# Patient Record
Sex: MALE | Race: WHITE | Employment: UNEMPLOYED | URBAN - NONMETROPOLITAN AREA
[De-identification: names, ages, dates, MRNs, and addresses within clinical notes are randomized per-mention and may not be internally consistent; named-entity substitution may affect disease eponyms.]

---

## 2017-01-03 ENCOUNTER — ANTI-COAG VISIT (OUTPATIENT)
Dept: OTHER | Age: 62
End: 2017-01-03

## 2017-01-03 VITALS
WEIGHT: 219.2 LBS | BODY MASS INDEX: 31.01 KG/M2 | DIASTOLIC BLOOD PRESSURE: 66 MMHG | HEART RATE: 82 BPM | SYSTOLIC BLOOD PRESSURE: 131 MMHG

## 2017-01-03 DIAGNOSIS — R79.1 ELEVATED INR: ICD-10-CM

## 2017-01-03 DIAGNOSIS — I82.401 DEEP VEIN THROMBOSIS (DVT) OF RIGHT LOWER EXTREMITY, UNSPECIFIED CHRONICITY, UNSPECIFIED VEIN (HCC): Primary | ICD-10-CM

## 2017-01-03 LAB — POC INR: 3.3 (ref 0.8–1.2)

## 2017-01-03 PROCEDURE — 99212 OFFICE O/P EST SF 10 MIN: CPT | Performed by: NURSE PRACTITIONER

## 2017-01-03 PROCEDURE — 85610 PROTHROMBIN TIME: CPT | Performed by: NURSE PRACTITIONER

## 2017-01-03 ASSESSMENT — ENCOUNTER SYMPTOMS
ANAL BLEEDING: 0
CONSTIPATION: 0
BLOOD IN STOOL: 0
SHORTNESS OF BREATH: 0
DIARRHEA: 0

## 2017-01-17 ENCOUNTER — ANTI-COAG VISIT (OUTPATIENT)
Dept: OTHER | Age: 62
End: 2017-01-17

## 2017-01-17 VITALS
WEIGHT: 214 LBS | HEART RATE: 80 BPM | SYSTOLIC BLOOD PRESSURE: 120 MMHG | BODY MASS INDEX: 30.27 KG/M2 | DIASTOLIC BLOOD PRESSURE: 94 MMHG

## 2017-01-17 DIAGNOSIS — I82.4Z3 DEEP VEIN THROMBOSIS (DVT) OF DISTAL VEIN OF BOTH LOWER EXTREMITIES, UNSPECIFIED CHRONICITY (HCC): ICD-10-CM

## 2017-01-17 DIAGNOSIS — I82.401 DEEP VEIN THROMBOSIS (DVT) OF RIGHT LOWER EXTREMITY, UNSPECIFIED CHRONICITY, UNSPECIFIED VEIN (HCC): ICD-10-CM

## 2017-01-17 LAB — POC INR: 3.5 (ref 0.8–1.2)

## 2017-01-17 PROCEDURE — 3017F COLORECTAL CA SCREEN DOC REV: CPT | Performed by: NURSE PRACTITIONER

## 2017-01-17 PROCEDURE — G8419 CALC BMI OUT NRM PARAM NOF/U: HCPCS | Performed by: NURSE PRACTITIONER

## 2017-01-17 PROCEDURE — G8427 DOCREV CUR MEDS BY ELIG CLIN: HCPCS | Performed by: NURSE PRACTITIONER

## 2017-01-17 PROCEDURE — 99999 PR OFFICE/OUTPT VISIT,PROCEDURE ONLY: CPT | Performed by: NURSE PRACTITIONER

## 2017-01-17 PROCEDURE — 85610 PROTHROMBIN TIME: CPT | Performed by: NURSE PRACTITIONER

## 2017-01-17 ASSESSMENT — ENCOUNTER SYMPTOMS
SHORTNESS OF BREATH: 0
CONSTIPATION: 0
DIARRHEA: 0
BLOOD IN STOOL: 0

## 2017-01-31 ENCOUNTER — ANTI-COAG VISIT (OUTPATIENT)
Dept: OTHER | Age: 62
End: 2017-01-31

## 2017-01-31 VITALS
DIASTOLIC BLOOD PRESSURE: 77 MMHG | HEART RATE: 68 BPM | WEIGHT: 212 LBS | BODY MASS INDEX: 29.99 KG/M2 | SYSTOLIC BLOOD PRESSURE: 113 MMHG

## 2017-01-31 DIAGNOSIS — I82.401 DEEP VEIN THROMBOSIS (DVT) OF RIGHT LOWER EXTREMITY, UNSPECIFIED CHRONICITY, UNSPECIFIED VEIN (HCC): ICD-10-CM

## 2017-01-31 DIAGNOSIS — I82.4Z3 DEEP VEIN THROMBOSIS (DVT) OF DISTAL VEIN OF BOTH LOWER EXTREMITIES, UNSPECIFIED CHRONICITY (HCC): ICD-10-CM

## 2017-01-31 LAB
HCT VFR BLD CALC: 43.3 % (ref 42–52)
HEMOGLOBIN: 14.6 GM/DL (ref 14–18)
POC INR: 1.6 (ref 0.8–1.2)

## 2017-01-31 PROCEDURE — G8419 CALC BMI OUT NRM PARAM NOF/U: HCPCS | Performed by: PHARMACIST

## 2017-01-31 PROCEDURE — 3017F COLORECTAL CA SCREEN DOC REV: CPT | Performed by: PHARMACIST

## 2017-01-31 PROCEDURE — 99999 PR OFFICE/OUTPT VISIT,PROCEDURE ONLY: CPT | Performed by: PHARMACIST

## 2017-01-31 PROCEDURE — G8427 DOCREV CUR MEDS BY ELIG CLIN: HCPCS | Performed by: PHARMACIST

## 2017-01-31 PROCEDURE — 85610 PROTHROMBIN TIME: CPT | Performed by: PHARMACIST

## 2017-01-31 ASSESSMENT — ENCOUNTER SYMPTOMS
SHORTNESS OF BREATH: 0
DIARRHEA: 0
CONSTIPATION: 0
BLOOD IN STOOL: 0

## 2017-02-14 ENCOUNTER — ANTI-COAG VISIT (OUTPATIENT)
Dept: OTHER | Age: 62
End: 2017-02-14

## 2017-02-14 VITALS
DIASTOLIC BLOOD PRESSURE: 75 MMHG | HEART RATE: 82 BPM | WEIGHT: 211.4 LBS | SYSTOLIC BLOOD PRESSURE: 120 MMHG | BODY MASS INDEX: 29.9 KG/M2

## 2017-02-14 DIAGNOSIS — I82.401 DEEP VEIN THROMBOSIS (DVT) OF RIGHT LOWER EXTREMITY, UNSPECIFIED CHRONICITY, UNSPECIFIED VEIN (HCC): ICD-10-CM

## 2017-02-14 DIAGNOSIS — I82.4Z3 DEEP VEIN THROMBOSIS (DVT) OF DISTAL VEIN OF BOTH LOWER EXTREMITIES, UNSPECIFIED CHRONICITY (HCC): ICD-10-CM

## 2017-02-14 LAB — POC INR: 1.3 (ref 0.8–1.2)

## 2017-02-14 PROCEDURE — 85610 PROTHROMBIN TIME: CPT | Performed by: NURSE PRACTITIONER

## 2017-02-14 PROCEDURE — G8419 CALC BMI OUT NRM PARAM NOF/U: HCPCS | Performed by: NURSE PRACTITIONER

## 2017-02-14 PROCEDURE — G8427 DOCREV CUR MEDS BY ELIG CLIN: HCPCS | Performed by: NURSE PRACTITIONER

## 2017-02-14 PROCEDURE — 99999 PR OFFICE/OUTPT VISIT,PROCEDURE ONLY: CPT | Performed by: NURSE PRACTITIONER

## 2017-02-14 PROCEDURE — 3017F COLORECTAL CA SCREEN DOC REV: CPT | Performed by: NURSE PRACTITIONER

## 2017-02-14 ASSESSMENT — ENCOUNTER SYMPTOMS
SHORTNESS OF BREATH: 0
NAUSEA: 1
BLOOD IN STOOL: 0
DIARRHEA: 0
CONSTIPATION: 0

## 2017-02-28 ENCOUNTER — ANTI-COAG VISIT (OUTPATIENT)
Dept: OTHER | Age: 62
End: 2017-02-28

## 2017-02-28 VITALS
SYSTOLIC BLOOD PRESSURE: 125 MMHG | DIASTOLIC BLOOD PRESSURE: 78 MMHG | HEART RATE: 91 BPM | WEIGHT: 207 LBS | BODY MASS INDEX: 29.28 KG/M2

## 2017-02-28 DIAGNOSIS — I82.401 DEEP VEIN THROMBOSIS (DVT) OF RIGHT LOWER EXTREMITY, UNSPECIFIED CHRONICITY, UNSPECIFIED VEIN (HCC): ICD-10-CM

## 2017-02-28 DIAGNOSIS — I82.4Z3 DEEP VEIN THROMBOSIS (DVT) OF DISTAL VEIN OF BOTH LOWER EXTREMITIES, UNSPECIFIED CHRONICITY (HCC): ICD-10-CM

## 2017-02-28 LAB — POC INR: 3.4 (ref 0.8–1.2)

## 2017-02-28 PROCEDURE — G8427 DOCREV CUR MEDS BY ELIG CLIN: HCPCS | Performed by: NURSE PRACTITIONER

## 2017-02-28 PROCEDURE — G8419 CALC BMI OUT NRM PARAM NOF/U: HCPCS | Performed by: NURSE PRACTITIONER

## 2017-02-28 PROCEDURE — 99999 PR OFFICE/OUTPT VISIT,PROCEDURE ONLY: CPT | Performed by: NURSE PRACTITIONER

## 2017-02-28 PROCEDURE — 3017F COLORECTAL CA SCREEN DOC REV: CPT | Performed by: NURSE PRACTITIONER

## 2017-02-28 PROCEDURE — 85610 PROTHROMBIN TIME: CPT | Performed by: NURSE PRACTITIONER

## 2017-02-28 ASSESSMENT — ENCOUNTER SYMPTOMS
DIARRHEA: 0
CONSTIPATION: 0
BLOOD IN STOOL: 0
SHORTNESS OF BREATH: 0

## 2017-03-13 DIAGNOSIS — I82.4Z3 DEEP VEIN THROMBOSIS (DVT) OF DISTAL VEIN OF BOTH LOWER EXTREMITIES, UNSPECIFIED CHRONICITY (HCC): Primary | ICD-10-CM

## 2017-03-13 DIAGNOSIS — Z79.01 ANTICOAGULATED ON COUMADIN: ICD-10-CM

## 2017-03-21 ENCOUNTER — ANTI-COAG VISIT (OUTPATIENT)
Dept: OTHER | Age: 62
End: 2017-03-21

## 2017-03-21 VITALS
WEIGHT: 212 LBS | DIASTOLIC BLOOD PRESSURE: 74 MMHG | HEART RATE: 68 BPM | BODY MASS INDEX: 29.99 KG/M2 | SYSTOLIC BLOOD PRESSURE: 124 MMHG

## 2017-03-21 DIAGNOSIS — Z79.01 ANTICOAGULATED ON COUMADIN: ICD-10-CM

## 2017-03-21 DIAGNOSIS — I82.4Z3 DEEP VEIN THROMBOSIS (DVT) OF DISTAL VEIN OF BOTH LOWER EXTREMITIES, UNSPECIFIED CHRONICITY (HCC): ICD-10-CM

## 2017-03-21 LAB — POC INR: 2.1 (ref 0.8–1.2)

## 2017-03-21 ASSESSMENT — ENCOUNTER SYMPTOMS
BLOOD IN STOOL: 0
SHORTNESS OF BREATH: 0
DIARRHEA: 0
CONSTIPATION: 0

## 2017-03-27 ENCOUNTER — OFFICE VISIT (OUTPATIENT)
Dept: PHYSICAL MEDICINE AND REHAB | Age: 62
End: 2017-03-27

## 2017-03-27 VITALS
DIASTOLIC BLOOD PRESSURE: 80 MMHG | WEIGHT: 211 LBS | SYSTOLIC BLOOD PRESSURE: 116 MMHG | HEART RATE: 71 BPM | BODY MASS INDEX: 30.21 KG/M2 | HEIGHT: 70 IN

## 2017-03-27 DIAGNOSIS — G57.02 NEUROPATHY OF LEFT SCIATIC NERVE: Primary | ICD-10-CM

## 2017-03-27 DIAGNOSIS — N53.9 MALE SEXUAL DYSFUNCTION: ICD-10-CM

## 2017-03-27 PROCEDURE — 3017F COLORECTAL CA SCREEN DOC REV: CPT | Performed by: PHYSICAL MEDICINE & REHABILITATION

## 2017-03-27 PROCEDURE — 99213 OFFICE O/P EST LOW 20 MIN: CPT | Performed by: PHYSICAL MEDICINE & REHABILITATION

## 2017-03-27 PROCEDURE — G8427 DOCREV CUR MEDS BY ELIG CLIN: HCPCS | Performed by: PHYSICAL MEDICINE & REHABILITATION

## 2017-03-27 PROCEDURE — G8419 CALC BMI OUT NRM PARAM NOF/U: HCPCS | Performed by: PHYSICAL MEDICINE & REHABILITATION

## 2017-03-27 PROCEDURE — 4004F PT TOBACCO SCREEN RCVD TLK: CPT | Performed by: PHYSICAL MEDICINE & REHABILITATION

## 2017-03-27 PROCEDURE — G8484 FLU IMMUNIZE NO ADMIN: HCPCS | Performed by: PHYSICAL MEDICINE & REHABILITATION

## 2017-03-27 RX ORDER — HYDROCODONE BITARTRATE AND ACETAMINOPHEN 10; 325 MG/1; MG/1
1 TABLET ORAL EVERY 6 HOURS PRN
Qty: 120 TABLET | Refills: 0 | Status: SHIPPED | OUTPATIENT
Start: 2017-03-27 | End: 2017-06-26 | Stop reason: SDUPTHER

## 2017-03-27 RX ORDER — SILDENAFIL 50 MG/1
50 TABLET, FILM COATED ORAL PRN
Qty: 10 TABLET | Refills: 0 | Status: SHIPPED | OUTPATIENT
Start: 2017-03-27 | End: 2017-06-26

## 2017-03-27 RX ORDER — HYDROCODONE BITARTRATE AND ACETAMINOPHEN 10; 325 MG/1; MG/1
1 TABLET ORAL EVERY 6 HOURS PRN
Qty: 120 TABLET | Refills: 0 | Status: SHIPPED | OUTPATIENT
Start: 2017-03-27 | End: 2017-06-26 | Stop reason: ALTCHOICE

## 2017-03-31 ENCOUNTER — TELEPHONE (OUTPATIENT)
Dept: PHYSICAL MEDICINE AND REHAB | Age: 62
End: 2017-03-31

## 2017-04-11 ENCOUNTER — ANTI-COAG VISIT (OUTPATIENT)
Dept: OTHER | Age: 62
End: 2017-04-11

## 2017-04-11 VITALS
SYSTOLIC BLOOD PRESSURE: 110 MMHG | HEART RATE: 77 BPM | WEIGHT: 213 LBS | DIASTOLIC BLOOD PRESSURE: 71 MMHG | BODY MASS INDEX: 30.15 KG/M2

## 2017-04-11 DIAGNOSIS — I82.4Z3 DEEP VEIN THROMBOSIS (DVT) OF DISTAL VEIN OF BOTH LOWER EXTREMITIES, UNSPECIFIED CHRONICITY (HCC): ICD-10-CM

## 2017-04-11 DIAGNOSIS — Z79.01 ANTICOAGULATED ON COUMADIN: ICD-10-CM

## 2017-04-11 LAB — POC INR: 2.1 (ref 0.8–1.2)

## 2017-04-11 ASSESSMENT — ENCOUNTER SYMPTOMS
SHORTNESS OF BREATH: 0
CONSTIPATION: 0
DIARRHEA: 0
BLOOD IN STOOL: 0

## 2017-05-09 ENCOUNTER — ANTI-COAG VISIT (OUTPATIENT)
Dept: OTHER | Age: 62
End: 2017-05-09

## 2017-05-09 VITALS
HEART RATE: 78 BPM | SYSTOLIC BLOOD PRESSURE: 112 MMHG | BODY MASS INDEX: 29.62 KG/M2 | WEIGHT: 209.2 LBS | DIASTOLIC BLOOD PRESSURE: 82 MMHG

## 2017-05-09 DIAGNOSIS — Z79.01 ANTICOAGULATED ON COUMADIN: ICD-10-CM

## 2017-05-09 DIAGNOSIS — I82.4Z3 DEEP VEIN THROMBOSIS (DVT) OF DISTAL VEIN OF BOTH LOWER EXTREMITIES, UNSPECIFIED CHRONICITY (HCC): ICD-10-CM

## 2017-05-09 LAB — POC INR: 2.4 (ref 0.8–1.2)

## 2017-05-09 ASSESSMENT — ENCOUNTER SYMPTOMS
DIARRHEA: 0
SHORTNESS OF BREATH: 0
BLOOD IN STOOL: 0
CONSTIPATION: 0

## 2017-06-06 ENCOUNTER — ANTI-COAG VISIT (OUTPATIENT)
Dept: OTHER | Age: 62
End: 2017-06-06

## 2017-06-06 VITALS
SYSTOLIC BLOOD PRESSURE: 133 MMHG | BODY MASS INDEX: 30.13 KG/M2 | HEART RATE: 72 BPM | DIASTOLIC BLOOD PRESSURE: 79 MMHG | WEIGHT: 212.8 LBS

## 2017-06-06 DIAGNOSIS — Z79.01 ANTICOAGULATED ON COUMADIN: ICD-10-CM

## 2017-06-06 DIAGNOSIS — I82.4Z3 DEEP VEIN THROMBOSIS (DVT) OF DISTAL VEIN OF BOTH LOWER EXTREMITIES, UNSPECIFIED CHRONICITY (HCC): ICD-10-CM

## 2017-06-06 LAB — POC INR: 2.1 (ref 0.8–1.2)

## 2017-06-06 ASSESSMENT — ENCOUNTER SYMPTOMS
DIARRHEA: 0
SHORTNESS OF BREATH: 0
BLOOD IN STOOL: 0
CONSTIPATION: 0

## 2017-06-26 ENCOUNTER — ANTI-COAG VISIT (OUTPATIENT)
Dept: OTHER | Age: 62
End: 2017-06-26

## 2017-06-26 ENCOUNTER — OFFICE VISIT (OUTPATIENT)
Dept: PHYSICAL MEDICINE AND REHAB | Age: 62
End: 2017-06-26

## 2017-06-26 VITALS
BODY MASS INDEX: 29.73 KG/M2 | WEIGHT: 210 LBS | HEART RATE: 65 BPM | SYSTOLIC BLOOD PRESSURE: 112 MMHG | DIASTOLIC BLOOD PRESSURE: 72 MMHG

## 2017-06-26 VITALS
SYSTOLIC BLOOD PRESSURE: 105 MMHG | WEIGHT: 213 LBS | BODY MASS INDEX: 30.49 KG/M2 | DIASTOLIC BLOOD PRESSURE: 72 MMHG | HEIGHT: 70 IN | HEART RATE: 73 BPM

## 2017-06-26 DIAGNOSIS — I82.4Z3 DEEP VEIN THROMBOSIS (DVT) OF DISTAL VEIN OF BOTH LOWER EXTREMITIES, UNSPECIFIED CHRONICITY (HCC): ICD-10-CM

## 2017-06-26 DIAGNOSIS — Z79.01 ANTICOAGULATED ON COUMADIN: ICD-10-CM

## 2017-06-26 DIAGNOSIS — G57.02 NEUROPATHY OF LEFT SCIATIC NERVE: Primary | ICD-10-CM

## 2017-06-26 LAB — POC INR: 1.9 (ref 0.8–1.2)

## 2017-06-26 PROCEDURE — 99213 OFFICE O/P EST LOW 20 MIN: CPT | Performed by: NURSE PRACTITIONER

## 2017-06-26 PROCEDURE — G8427 DOCREV CUR MEDS BY ELIG CLIN: HCPCS | Performed by: NURSE PRACTITIONER

## 2017-06-26 PROCEDURE — G8417 CALC BMI ABV UP PARAM F/U: HCPCS | Performed by: NURSE PRACTITIONER

## 2017-06-26 PROCEDURE — 4004F PT TOBACCO SCREEN RCVD TLK: CPT | Performed by: NURSE PRACTITIONER

## 2017-06-26 PROCEDURE — 3017F COLORECTAL CA SCREEN DOC REV: CPT | Performed by: NURSE PRACTITIONER

## 2017-06-26 RX ORDER — HYDROCODONE BITARTRATE AND ACETAMINOPHEN 10; 325 MG/1; MG/1
1 TABLET ORAL EVERY 6 HOURS PRN
Qty: 120 TABLET | Refills: 0 | Status: SHIPPED | OUTPATIENT
Start: 2017-06-26 | End: 2017-10-02 | Stop reason: SDUPTHER

## 2017-06-26 RX ORDER — HYDROCODONE BITARTRATE AND ACETAMINOPHEN 10; 325 MG/1; MG/1
1 TABLET ORAL EVERY 6 HOURS PRN
Qty: 120 TABLET | Refills: 0 | Status: SHIPPED | OUTPATIENT
Start: 2017-06-26 | End: 2017-10-02 | Stop reason: ALTCHOICE

## 2017-06-26 ASSESSMENT — ENCOUNTER SYMPTOMS
BLOOD IN STOOL: 0
DIARRHEA: 0
CONSTIPATION: 0
SHORTNESS OF BREATH: 0

## 2017-08-24 ENCOUNTER — HOSPITAL ENCOUNTER (OUTPATIENT)
Dept: PHARMACY | Age: 62
Setting detail: THERAPIES SERIES
Discharge: HOME | End: 2017-08-24
Payer: MEDICARE

## 2017-08-24 VITALS
HEART RATE: 68 BPM | DIASTOLIC BLOOD PRESSURE: 75 MMHG | WEIGHT: 208.4 LBS | BODY MASS INDEX: 29.5 KG/M2 | SYSTOLIC BLOOD PRESSURE: 134 MMHG

## 2017-08-24 DIAGNOSIS — I82.4Z3 DEEP VEIN THROMBOSIS (DVT) OF DISTAL VEIN OF BOTH LOWER EXTREMITIES, UNSPECIFIED CHRONICITY (HCC): ICD-10-CM

## 2017-08-24 LAB
HCT VFR BLD CALC: 43.6 % (ref 41–53)
HEMOGLOBIN: 14.6 G/DL (ref 13.5–17.5)
INR BLD: 1.3
POC INR: 1.3 (ref 0.8–1.2)

## 2017-08-24 PROCEDURE — 36416 COLLJ CAPILLARY BLOOD SPEC: CPT | Performed by: PHARMACIST

## 2017-08-24 PROCEDURE — 85610 PROTHROMBIN TIME: CPT | Performed by: PHARMACIST

## 2017-08-24 PROCEDURE — 99211 OFF/OP EST MAY X REQ PHY/QHP: CPT | Performed by: PHARMACIST

## 2017-09-05 ENCOUNTER — HOSPITAL ENCOUNTER (OUTPATIENT)
Dept: PHARMACY | Age: 62
Setting detail: THERAPIES SERIES
Discharge: HOME | End: 2017-09-05
Payer: MEDICARE

## 2017-09-05 VITALS
BODY MASS INDEX: 29.13 KG/M2 | WEIGHT: 205.8 LBS | DIASTOLIC BLOOD PRESSURE: 68 MMHG | SYSTOLIC BLOOD PRESSURE: 106 MMHG | HEART RATE: 84 BPM

## 2017-09-05 DIAGNOSIS — I82.4Z3 DEEP VEIN THROMBOSIS (DVT) OF DISTAL VEIN OF BOTH LOWER EXTREMITIES, UNSPECIFIED CHRONICITY (HCC): ICD-10-CM

## 2017-09-05 DIAGNOSIS — I82.401 DEEP VEIN THROMBOSIS (DVT) OF RIGHT LOWER EXTREMITY, UNSPECIFIED CHRONICITY, UNSPECIFIED VEIN (HCC): ICD-10-CM

## 2017-09-05 LAB — POC INR: 1.4 (ref 0.8–1.2)

## 2017-09-05 PROCEDURE — 36416 COLLJ CAPILLARY BLOOD SPEC: CPT

## 2017-09-05 PROCEDURE — 99212 OFFICE O/P EST SF 10 MIN: CPT

## 2017-09-05 PROCEDURE — 85610 PROTHROMBIN TIME: CPT

## 2017-09-05 RX ORDER — WARFARIN SODIUM 5 MG/1
TABLET ORAL
Qty: 60 TABLET | Refills: 11 | Status: SHIPPED | OUTPATIENT
Start: 2017-09-05 | End: 2017-09-19 | Stop reason: SDUPTHER

## 2017-09-05 RX ORDER — WARFARIN SODIUM 5 MG/1
TABLET ORAL EVERY MORNING
COMMUNITY
End: 2017-09-19

## 2017-09-05 ASSESSMENT — ENCOUNTER SYMPTOMS
BLOOD IN STOOL: 0
DIARRHEA: 0
CONSTIPATION: 0

## 2017-09-14 ENCOUNTER — TELEPHONE (OUTPATIENT)
Dept: ADMINISTRATIVE | Age: 62
End: 2017-09-14

## 2017-09-19 ENCOUNTER — HOSPITAL ENCOUNTER (OUTPATIENT)
Dept: PHARMACY | Age: 62
Setting detail: THERAPIES SERIES
Discharge: HOME | End: 2017-09-19
Payer: MEDICARE

## 2017-09-19 VITALS
WEIGHT: 207.2 LBS | DIASTOLIC BLOOD PRESSURE: 69 MMHG | HEART RATE: 71 BPM | SYSTOLIC BLOOD PRESSURE: 106 MMHG | BODY MASS INDEX: 29.33 KG/M2

## 2017-09-19 DIAGNOSIS — I82.4Z3 DEEP VEIN THROMBOSIS (DVT) OF DISTAL VEIN OF BOTH LOWER EXTREMITIES, UNSPECIFIED CHRONICITY (HCC): ICD-10-CM

## 2017-09-19 DIAGNOSIS — I82.401 DEEP VEIN THROMBOSIS (DVT) OF RIGHT LOWER EXTREMITY, UNSPECIFIED CHRONICITY, UNSPECIFIED VEIN (HCC): ICD-10-CM

## 2017-09-19 LAB — POC INR: 2.3 (ref 0.8–1.2)

## 2017-09-19 PROCEDURE — 36416 COLLJ CAPILLARY BLOOD SPEC: CPT

## 2017-09-19 PROCEDURE — 99212 OFFICE O/P EST SF 10 MIN: CPT

## 2017-09-19 PROCEDURE — 85610 PROTHROMBIN TIME: CPT

## 2017-09-19 RX ORDER — WARFARIN SODIUM 5 MG/1
TABLET ORAL
Qty: 60 TABLET | Refills: 11 | Status: SHIPPED | OUTPATIENT
Start: 2017-09-19 | End: 2017-10-16

## 2017-09-19 RX ORDER — WARFARIN SODIUM 5 MG/1
TABLET ORAL EVERY MORNING
COMMUNITY
End: 2017-10-16 | Stop reason: SDUPTHER

## 2017-09-19 ASSESSMENT — ENCOUNTER SYMPTOMS
SHORTNESS OF BREATH: 1
BLOOD IN STOOL: 0
DIARRHEA: 0
VOMITING: 1
CONSTIPATION: 0

## 2017-09-20 RX ORDER — WARFARIN SODIUM 5 MG/1
TABLET ORAL
Qty: 60 TABLET | Refills: 11 | Status: SHIPPED | OUTPATIENT
Start: 2017-09-20 | End: 2018-11-12 | Stop reason: SDUPTHER

## 2017-10-02 ENCOUNTER — HOSPITAL ENCOUNTER (OUTPATIENT)
Dept: PHARMACY | Age: 62
Setting detail: THERAPIES SERIES
Discharge: HOME | End: 2017-10-02
Payer: MEDICARE

## 2017-10-02 ENCOUNTER — OFFICE VISIT (OUTPATIENT)
Dept: PHYSICAL MEDICINE AND REHAB | Age: 62
End: 2017-10-02
Payer: MEDICARE

## 2017-10-02 VITALS
WEIGHT: 207.89 LBS | DIASTOLIC BLOOD PRESSURE: 75 MMHG | SYSTOLIC BLOOD PRESSURE: 121 MMHG | HEART RATE: 53 BPM | BODY MASS INDEX: 29.76 KG/M2 | HEIGHT: 70 IN

## 2017-10-02 VITALS
SYSTOLIC BLOOD PRESSURE: 107 MMHG | WEIGHT: 207.8 LBS | HEART RATE: 56 BPM | DIASTOLIC BLOOD PRESSURE: 60 MMHG | BODY MASS INDEX: 29.42 KG/M2

## 2017-10-02 DIAGNOSIS — G89.21 CHRONIC PAIN DUE TO TRAUMA: Primary | ICD-10-CM

## 2017-10-02 DIAGNOSIS — G57.02 NEUROPATHY OF LEFT SCIATIC NERVE: ICD-10-CM

## 2017-10-02 DIAGNOSIS — I82.4Z3 DEEP VEIN THROMBOSIS (DVT) OF DISTAL VEIN OF BOTH LOWER EXTREMITIES, UNSPECIFIED CHRONICITY (HCC): ICD-10-CM

## 2017-10-02 LAB — POC INR: 2.4 (ref 0.8–1.2)

## 2017-10-02 PROCEDURE — 4004F PT TOBACCO SCREEN RCVD TLK: CPT | Performed by: NURSE PRACTITIONER

## 2017-10-02 PROCEDURE — G8427 DOCREV CUR MEDS BY ELIG CLIN: HCPCS | Performed by: NURSE PRACTITIONER

## 2017-10-02 PROCEDURE — G8417 CALC BMI ABV UP PARAM F/U: HCPCS | Performed by: NURSE PRACTITIONER

## 2017-10-02 PROCEDURE — G8484 FLU IMMUNIZE NO ADMIN: HCPCS | Performed by: NURSE PRACTITIONER

## 2017-10-02 PROCEDURE — 99211 OFF/OP EST MAY X REQ PHY/QHP: CPT

## 2017-10-02 PROCEDURE — 36416 COLLJ CAPILLARY BLOOD SPEC: CPT

## 2017-10-02 PROCEDURE — 85610 PROTHROMBIN TIME: CPT

## 2017-10-02 PROCEDURE — 99213 OFFICE O/P EST LOW 20 MIN: CPT | Performed by: NURSE PRACTITIONER

## 2017-10-02 PROCEDURE — 3017F COLORECTAL CA SCREEN DOC REV: CPT | Performed by: NURSE PRACTITIONER

## 2017-10-02 RX ORDER — HYDROCODONE BITARTRATE AND ACETAMINOPHEN 10; 325 MG/1; MG/1
1 TABLET ORAL EVERY 6 HOURS PRN
Qty: 120 TABLET | Refills: 0 | Status: SHIPPED | OUTPATIENT
Start: 2017-10-02 | End: 2018-01-08 | Stop reason: SDUPTHER

## 2017-10-02 RX ORDER — ROSUVASTATIN CALCIUM 20 MG/1
20 TABLET, COATED ORAL DAILY
Refills: 0 | COMMUNITY
Start: 2017-08-28 | End: 2017-12-11

## 2017-10-02 ASSESSMENT — ENCOUNTER SYMPTOMS
BLOOD IN STOOL: 0
DIARRHEA: 0
CONSTIPATION: 0
SHORTNESS OF BREATH: 0
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
## 2017-10-02 NOTE — IP AVS SNAPSHOT
Abnormal Final result (Collected: 10/2/2017 10:53 AM)           10/2/2017 10:56 AM      Component Results     Component Value Ref Range & Units Status    POC INR 2.40 (H) 0.80 - 1.20 Final    ---------INDICATION-----------------------INR Reference Range  DVT, PE, AF, AMI, tissue heart valve          2.0 to 3.0  Mechanical prosthetic valves                  2.5 to 3.5  Performed at [address] [place] [address], [address]                 Additional Information        Basic Information     Date Of Birth Sex Race Ethnicity Preferred Language    1955 Male White Non-/Non  English      Problem List as of 10/2/2017  Date Reviewed: 9/19/2017                Anticoagulated on Coumadin    Neuropathy of left sciatic nerve    Male sexual dysfunction      Preventive Care        Date Due    Hepatitis C screening is recommended for all adults regardless of risk factors born between [facility] at least once (lifetime) who have never been tested. 1955    HIV screening is recommended for all people regardless of risk factors  aged 15-65 years at least once (lifetime) who have never been HIV tested. 5/29/1968    Tetanus Combination Vaccine (1 - Tdap) 5/29/1972    Pneumococcal Vaccine - Pneumovax for adults aged 19-64 years with: chronic heart disease, chronic lung disease, diabetes mellitus, alcoholism, chronic liver disease, or cigarette smoking. (1 of 1 - PPSV23) 5/29/1972    Diabetes Screening 5/29/1993    Colonoscopy 5/29/2003    Zoster Vaccine 5/29/2013    Cholesterol Screening 7/12/2017    Yearly Flu Vaccine (1) 9/1/2017            Agentekt Signup           Our records indicate that you have an active modu account. You can view your After Visit Summary by going to [email or web address] and logging in with your modu username and password.       If you don't have a modu username and password but a parent or guardian has access to your record, the parent or guardian should login with their own GÃ¼venRehberi username and password and access your record to view the After Visit Summary. Additional Information  If you have questions, please contact the physician practice where you receive care. Remember, GÃ¼venRehberi is NOT to be used for urgent needs. For medical emergencies, dial 911. For questions regarding your GÃ¼venRehberi account call [phone]. If you have a clinical question, please call your doctor's office. October 2017 Details    [facility] Tue Wed Thu Fri Sat     1               2      7.5 mg   See details      3      10 mg         4      7.5 mg         5      10 mg         6      7.5 mg         7      10 mg           8      10 mg         9      7.5 mg         10      10 mg         11      7.5 mg         12      10 mg         13      7.5 mg         14      10 mg           15      10 mg         16      7.5 mg         17               18               19               20               21                 22               23               24               25               26               27               28                 29               30               31                    Date Details   10/02 This INR check       Date of next INR:  10/16/2017         How to take your warfarin dose              To take:  7.5 mg Take 1.5 of the 5 mg tablets. To take:  10 mg Take 2 of the 5 mg tablets.

## 2017-10-16 ENCOUNTER — HOSPITAL ENCOUNTER (OUTPATIENT)
Dept: PHARMACY | Age: 62
Setting detail: THERAPIES SERIES
Discharge: HOME | End: 2017-10-16
Payer: MEDICARE

## 2017-10-16 VITALS
WEIGHT: 202.8 LBS | BODY MASS INDEX: 28.71 KG/M2 | SYSTOLIC BLOOD PRESSURE: 107 MMHG | DIASTOLIC BLOOD PRESSURE: 73 MMHG | HEART RATE: 68 BPM

## 2017-10-16 DIAGNOSIS — I82.4Z3 DEEP VEIN THROMBOSIS (DVT) OF DISTAL VEIN OF BOTH LOWER EXTREMITIES, UNSPECIFIED CHRONICITY (HCC): ICD-10-CM

## 2017-10-16 LAB — POC INR: 1.7 (ref 0.8–1.2)

## 2017-10-16 PROCEDURE — 99211 OFF/OP EST MAY X REQ PHY/QHP: CPT

## 2017-10-16 PROCEDURE — 85610 PROTHROMBIN TIME: CPT

## 2017-10-16 PROCEDURE — 36416 COLLJ CAPILLARY BLOOD SPEC: CPT

## 2017-10-16 ASSESSMENT — ENCOUNTER SYMPTOMS
SHORTNESS OF BREATH: 1
CONSTIPATION: 0
VOMITING: 0
DIARRHEA: 0
BLOOD IN STOOL: 0

## 2017-10-16 NOTE — PROGRESS NOTES
The [address] [facility]  Anticoagulation Clinic  [phone] (phone)  [phone] (fax)  Subjective:      Patient ID: Kelly Crain is a 57 y.o. male. HPI  Has diagnosis of recurrent DVT, per Dr. Meek Stiles' referral - being seen for Warfarin monitoring (2 week  visit; late for today's visit). Correctly states dose/tablet strength. Hasn't had yet today. Denies missed dose. Lost 5# in interim. Smoking 1 pack of cigarettes approx. every 2 days; had none from last visit to last week, then started up again. Doesn't often smoke whole cigarette. Still not taking MVI faithfully - usual.  States family doctor ordered cholesterol med., but doesn't want to start it due to possible side effects. Drank a can of V8 juice last week for 4-5 days in a row. Reminded of high Vitamin K content. Will stop it. Review of Systems   Constitutional: Negative for activity change, appetite change and fatigue. HENT: Negative for nosebleeds. Respiratory: Positive for shortness of breath (usual). Cardiovascular: Positive for leg swelling (usual bilat. , if on them too much). Negative for chest pain. Gastrointestinal: Negative for blood in stool, constipation, diarrhea and vomiting. Genitourinary: Negative for hematuria. Neurological: Negative for dizziness, weakness, light-headedness and headaches. Hematological: Does not bruise/bleed easily. Objective:   Physical Exam   Constitutional: He is oriented to person, place, and time. He appears well-developed and well-nourished. Cardiovascular: Normal rate. Pulmonary/Chest: Effort normal.   Neurological: He is alert and oriented to person, place, and time. Psychiatric: He has a normal mood and affect. His behavior is normal.   Vitals reviewed. Assessment:      Lab Results   Component Value Date    INR 1.70 (H) 10/16/2017    INR 2.40 (H) 10/02/2017    INR 2.30 (H) 09/19/2017     INR subtherapeutic  - goal 2-3.   Recent Labs

## 2017-10-30 ENCOUNTER — HOSPITAL ENCOUNTER (OUTPATIENT)
Dept: PHARMACY | Age: 62
Setting detail: THERAPIES SERIES
Discharge: HOME | End: 2017-10-30
Payer: MEDICARE

## 2017-10-30 VITALS
DIASTOLIC BLOOD PRESSURE: 77 MMHG | SYSTOLIC BLOOD PRESSURE: 119 MMHG | HEART RATE: 74 BPM | BODY MASS INDEX: 28.43 KG/M2 | WEIGHT: 200.8 LBS

## 2017-10-30 DIAGNOSIS — I82.4Z3 DEEP VEIN THROMBOSIS (DVT) OF DISTAL VEIN OF BOTH LOWER EXTREMITIES, UNSPECIFIED CHRONICITY (HCC): ICD-10-CM

## 2017-10-30 LAB — POC INR: 2.4 (ref 0.8–1.2)

## 2017-10-30 PROCEDURE — 85610 PROTHROMBIN TIME: CPT

## 2017-10-30 PROCEDURE — 36416 COLLJ CAPILLARY BLOOD SPEC: CPT

## 2017-10-30 PROCEDURE — 99211 OFF/OP EST MAY X REQ PHY/QHP: CPT

## 2017-10-30 ASSESSMENT — ENCOUNTER SYMPTOMS
DIARRHEA: 0
SHORTNESS OF BREATH: 0
CONSTIPATION: 0
BLOOD IN STOOL: 0

## 2017-10-30 NOTE — PROGRESS NOTES
The [facility]  Anticoagulation Clinic  [phone] (phone)           [phone] (fax)    Visit Date: 10/30/2017     Subjective:       Patient ID: Jerrica Love, 57 y.o., male    HPI  Patient seen in clinic for anticoagulation management due to recurrent DVT with a goal INR of 2.0-3.0. Patient last seen 2 week(s) ago. INR ordered and reviewed today. Patient denies any new Rx medications. Patient denies any OTC medications or products. Patient denies any missed doses. Patient denies change in diet. Patient denies change in tobacco/alcohol use. Patient denies upcoming surgeries. Review of Systems   Constitutional: Negative for activity change, appetite change and fatigue. HENT: Negative for nosebleeds. Respiratory: Negative for shortness of breath. Cardiovascular: Negative for chest pain and leg swelling. Gastrointestinal: Negative for blood in stool, constipation and diarrhea. Genitourinary: Negative for hematuria. Neurological: Negative for weakness, light-headedness and headaches. Hematological: Does not bruise/bleed easily.        Objective:   Physical Exam   Vitals:    10/30/17 1437   BP: 119/77   Pulse: 74       Physical Exam    Assessment:      Lab Results   Component Value Date    INR 2.40 (H) 10/30/2017    INR 1.70 (H) 10/16/2017    INR 2.40 (H) 10/02/2017     INR therapeutic   Recent Labs      10/30/17   1435   INR  2.40*                               Plan:      Continue Coumadin 7.5mg MWF, 10mg STTHS. Recheck INR 3 weeks. Patient reminded to call the Anticoagulation Clinic with any signs or symptoms of bleeding or with any medication changes. Patient given instructions utilizing the teach back method. Discharged ambulatory in no apparent distress.

## 2017-11-20 ENCOUNTER — HOSPITAL ENCOUNTER (OUTPATIENT)
Dept: PHARMACY | Age: 62
Setting detail: THERAPIES SERIES
Discharge: HOME | End: 2017-11-20
Payer: MEDICARE

## 2017-11-20 VITALS
HEART RATE: 69 BPM | BODY MASS INDEX: 28.94 KG/M2 | SYSTOLIC BLOOD PRESSURE: 106 MMHG | WEIGHT: 204.4 LBS | DIASTOLIC BLOOD PRESSURE: 71 MMHG

## 2017-11-20 DIAGNOSIS — I82.4Z3 DEEP VEIN THROMBOSIS (DVT) OF DISTAL VEIN OF BOTH LOWER EXTREMITIES, UNSPECIFIED CHRONICITY (HCC): ICD-10-CM

## 2017-11-20 LAB — POC INR: 3.2 (ref 0.8–1.2)

## 2017-11-20 PROCEDURE — 36416 COLLJ CAPILLARY BLOOD SPEC: CPT

## 2017-11-20 PROCEDURE — 85610 PROTHROMBIN TIME: CPT

## 2017-11-20 PROCEDURE — 99211 OFF/OP EST MAY X REQ PHY/QHP: CPT

## 2017-11-20 ASSESSMENT — ENCOUNTER SYMPTOMS
SHORTNESS OF BREATH: 1
BLOOD IN STOOL: 0
VOMITING: 0
DIARRHEA: 0
CONSTIPATION: 0

## 2017-11-20 NOTE — PROGRESS NOTES
The [address] [facility]  Anticoagulation Clinic  [phone] (phone)  [phone] (fax)  Subjective:      Patient ID: Willem Curry is a 57 y.o. male. HPI  Has diagnosis of recurrent DVT, per Dr. Marnie Mayorga' referral - being seen for Warfarin monitoring (3 week  visit). Correctly states tablet strength; follows printed instructions for dose. Hasn't had yet today. Denies missed dose. Still not taking MVI faithfully - usual.  States has probably been a month since he took it. Had less alcohol. Review of Systems   Constitutional: Negative for activity change (decreased), appetite change and fatigue. HENT: Negative for nosebleeds. Respiratory: Positive for shortness of breath (usual). Cardiovascular: Negative for chest pain and leg swelling. Gastrointestinal: Negative for blood in stool, constipation, diarrhea (Nov. 14-17) and vomiting. Genitourinary: Negative for hematuria. Neurological: Negative for dizziness, weakness, light-headedness and headaches. Hematological: Does not bruise/bleed easily. Objective:   Physical Exam   Constitutional: He is oriented to person, place, and time. He appears well-developed and well-nourished. Cardiovascular: Normal rate. Pulmonary/Chest: Effort normal.   Neurological: He is alert and oriented to person, place, and time. Psychiatric: He has a normal mood and affect. His behavior is normal.   Vitals reviewed. Assessment:      Lab Results   Component Value Date    INR 3.20 (H) 11/20/2017    INR 2.40 (H) 10/30/2017    INR 1.70 (H) 10/16/2017     INR supratherapeutic - goal 2-3. Recent Labs      11/20/17   1423   INR  3.20*         Plan:    POCT INR ordered/performed/reviewed. 5 mg today, M, then continue Coumadin 7.5 mg MWF, 10 mg TThSS PO. Recheck INR 3 weeks. (Report given - orders entered by KENNETH Mark, PharmD.)  Patient reminded to call the Anticoagulation Clinic with any signs or symptoms of bleeding or with any medication changes. Patient given instructions utilizing the teach back method. Discharged ambulatory in no apparent distress.

## 2017-12-11 ENCOUNTER — HOSPITAL ENCOUNTER (OUTPATIENT)
Dept: PHARMACY | Age: 62
Setting detail: THERAPIES SERIES
Discharge: HOME | End: 2017-12-11
Payer: MEDICARE

## 2017-12-11 DIAGNOSIS — I82.4Z3 DEEP VEIN THROMBOSIS (DVT) OF DISTAL VEIN OF BOTH LOWER EXTREMITIES, UNSPECIFIED CHRONICITY (HCC): ICD-10-CM

## 2017-12-11 DIAGNOSIS — I26.99 OTHER PULMONARY EMBOLISM WITHOUT ACUTE COR PULMONALE, UNSPECIFIED CHRONICITY (HCC): ICD-10-CM

## 2017-12-11 LAB — POC INR: 2.3 (ref 0.8–1.2)

## 2017-12-11 PROCEDURE — 36416 COLLJ CAPILLARY BLOOD SPEC: CPT

## 2017-12-11 PROCEDURE — 85610 PROTHROMBIN TIME: CPT

## 2017-12-11 PROCEDURE — 99211 OFF/OP EST MAY X REQ PHY/QHP: CPT

## 2017-12-11 ASSESSMENT — ENCOUNTER SYMPTOMS
VOMITING: 0
DIARRHEA: 0
BLOOD IN STOOL: 0
CONSTIPATION: 0
SHORTNESS OF BREATH: 1

## 2017-12-11 NOTE — PROGRESS NOTES
The [address] [facility]  Anticoagulation Clinic  [phone] (phone)  [phone] (fax)  Subjective:      Patient ID: Cassie Quinteros is a 57 y.o. male. HPI  Has diagnoses of history of PE/recurrent DVT, per Dr. Nicky Dillon' referral - being seen for Warfarin monitoring (3 week  visit). Correctly states tablet strength; follows printed instructions for dose. Hasn't had yet today. Denies missed dose. Still not taking MVI faithfully - usual.  States has probably been a month since he took it. Consistent with alcohol. Has had some pain back of right knee - relieved by Norco; blames on dog sitting on lap. Review of Systems   Constitutional: Negative for activity change (decreased), appetite change and fatigue. HENT: Negative for nosebleeds. Respiratory: Positive for shortness of breath (usual). Cardiovascular: Positive for leg swelling (usual, bilat. ). Negative for chest pain. Gastrointestinal: Negative for blood in stool, constipation, diarrhea and vomiting. Genitourinary: Negative for hematuria. Neurological: Negative for dizziness, weakness, light-headedness and headaches. Hematological: Does not bruise/bleed easily. Objective:   Physical Exam   Constitutional: He is oriented to person, place, and time. He appears well-developed and well-nourished. Pulmonary/Chest: Effort normal.   Neurological: He is alert and oriented to person, place, and time. Skin: Skin is warm and dry. Psychiatric: He has a normal mood and affect. His behavior is normal.       Assessment:      Lab Results   Component Value Date    INR 2.30 (H) 12/11/2017    INR 3.20 (H) 11/20/2017    INR 2.40 (H) 10/30/2017     INR therapeutic  - goal 2-3. Recent Labs      12/11/17   1401   INR  2.30*         Plan:    POCT INR ordered/performed/reviewed. Continue Coumadin 7.5 mg MWF, 10 mg TThSS PO. Recheck INR 4 weeks.    Patient reminded to call the Anticoagulation Clinic with any signs or symptoms of bleeding or with any medication changes. Patient given instructions utilizing the teach back method. Discharged ambulatory in no apparent distress.

## 2018-01-08 ENCOUNTER — OFFICE VISIT (OUTPATIENT)
Dept: PHYSICAL MEDICINE AND REHAB | Age: 63
End: 2018-01-08
Payer: MEDICARE

## 2018-01-08 ENCOUNTER — HOSPITAL ENCOUNTER (OUTPATIENT)
Dept: PHARMACY | Age: 63
Setting detail: THERAPIES SERIES
Discharge: HOME | End: 2018-01-08
Payer: MEDICARE

## 2018-01-08 VITALS
SYSTOLIC BLOOD PRESSURE: 119 MMHG | HEART RATE: 77 BPM | BODY MASS INDEX: 29.63 KG/M2 | HEIGHT: 70 IN | WEIGHT: 207 LBS | DIASTOLIC BLOOD PRESSURE: 77 MMHG

## 2018-01-08 DIAGNOSIS — G57.02 NEUROPATHY OF LEFT SCIATIC NERVE: Primary | ICD-10-CM

## 2018-01-08 DIAGNOSIS — G89.21 CHRONIC PAIN DUE TO TRAUMA: ICD-10-CM

## 2018-01-08 DIAGNOSIS — I82.4Y9 DEEP VEIN THROMBOSIS (DVT) OF PROXIMAL LOWER EXTREMITY, UNSPECIFIED CHRONICITY, UNSPECIFIED LATERALITY (HCC): ICD-10-CM

## 2018-01-08 DIAGNOSIS — I26.99 OTHER PULMONARY EMBOLISM WITHOUT ACUTE COR PULMONALE, UNSPECIFIED CHRONICITY (HCC): ICD-10-CM

## 2018-01-08 LAB — POC INR: 2.8 (ref 0.8–1.2)

## 2018-01-08 PROCEDURE — G8484 FLU IMMUNIZE NO ADMIN: HCPCS | Performed by: NURSE PRACTITIONER

## 2018-01-08 PROCEDURE — 4004F PT TOBACCO SCREEN RCVD TLK: CPT | Performed by: NURSE PRACTITIONER

## 2018-01-08 PROCEDURE — 99211 OFF/OP EST MAY X REQ PHY/QHP: CPT

## 2018-01-08 PROCEDURE — 85610 PROTHROMBIN TIME: CPT

## 2018-01-08 PROCEDURE — 3017F COLORECTAL CA SCREEN DOC REV: CPT | Performed by: NURSE PRACTITIONER

## 2018-01-08 PROCEDURE — G8427 DOCREV CUR MEDS BY ELIG CLIN: HCPCS | Performed by: NURSE PRACTITIONER

## 2018-01-08 PROCEDURE — G8417 CALC BMI ABV UP PARAM F/U: HCPCS | Performed by: NURSE PRACTITIONER

## 2018-01-08 PROCEDURE — 99213 OFFICE O/P EST LOW 20 MIN: CPT | Performed by: NURSE PRACTITIONER

## 2018-01-08 PROCEDURE — 36416 COLLJ CAPILLARY BLOOD SPEC: CPT

## 2018-01-08 RX ORDER — HYDROCODONE BITARTRATE AND ACETAMINOPHEN 10; 325 MG/1; MG/1
1 TABLET ORAL EVERY 6 HOURS PRN
Qty: 120 TABLET | Refills: 0 | Status: SHIPPED | OUTPATIENT
Start: 2018-01-08 | End: 2018-02-07

## 2018-02-05 ENCOUNTER — HOSPITAL ENCOUNTER (OUTPATIENT)
Dept: PHARMACY | Age: 63
Setting detail: THERAPIES SERIES
Discharge: HOME | End: 2018-02-05
Payer: MEDICARE

## 2018-02-05 DIAGNOSIS — I82.4Y9 DEEP VEIN THROMBOSIS (DVT) OF PROXIMAL LOWER EXTREMITY, UNSPECIFIED CHRONICITY, UNSPECIFIED LATERALITY (HCC): ICD-10-CM

## 2018-02-05 DIAGNOSIS — I26.99 OTHER PULMONARY EMBOLISM WITHOUT ACUTE COR PULMONALE, UNSPECIFIED CHRONICITY (HCC): ICD-10-CM

## 2018-02-05 LAB — POC INR: 2.4 (ref 0.8–1.2)

## 2018-02-05 PROCEDURE — 36416 COLLJ CAPILLARY BLOOD SPEC: CPT

## 2018-02-05 PROCEDURE — 85610 PROTHROMBIN TIME: CPT

## 2018-02-05 PROCEDURE — 99211 OFF/OP EST MAY X REQ PHY/QHP: CPT

## 2018-03-05 ENCOUNTER — HOSPITAL ENCOUNTER (OUTPATIENT)
Dept: PHARMACY | Age: 63
Setting detail: THERAPIES SERIES
Discharge: HOME | End: 2018-03-05
Payer: MEDICARE

## 2018-03-05 ENCOUNTER — HOSPITAL ENCOUNTER (OUTPATIENT)
Age: 63
Discharge: HOME | End: 2018-03-05
Payer: MEDICARE

## 2018-03-05 DIAGNOSIS — Z79.01 ANTICOAGULATED ON COUMADIN: ICD-10-CM

## 2018-03-05 DIAGNOSIS — I26.99 OTHER PULMONARY EMBOLISM WITHOUT ACUTE COR PULMONALE, UNSPECIFIED CHRONICITY (HCC): ICD-10-CM

## 2018-03-05 DIAGNOSIS — I82.4Y9 DEEP VEIN THROMBOSIS (DVT) OF PROXIMAL LOWER EXTREMITY, UNSPECIFIED CHRONICITY, UNSPECIFIED LATERALITY (HCC): ICD-10-CM

## 2018-03-05 DIAGNOSIS — Z79.01 ANTICOAGULATED ON COUMADIN: Primary | ICD-10-CM

## 2018-03-05 LAB
HCT VFR BLD CALC: 44 % (ref 42–52)
HEMOGLOBIN: 15.2 GM/DL (ref 14–18)
POC INR: 1.8 (ref 0.8–1.2)

## 2018-03-05 PROCEDURE — 85018 HEMOGLOBIN: CPT

## 2018-03-05 PROCEDURE — 85014 HEMATOCRIT: CPT

## 2018-03-05 PROCEDURE — 99211 OFF/OP EST MAY X REQ PHY/QHP: CPT

## 2018-03-05 PROCEDURE — 36415 COLL VENOUS BLD VENIPUNCTURE: CPT

## 2018-03-05 PROCEDURE — 85610 PROTHROMBIN TIME: CPT

## 2018-03-05 PROCEDURE — 36416 COLLJ CAPILLARY BLOOD SPEC: CPT

## 2018-03-05 RX ORDER — HYDROCODONE BITARTRATE AND ACETAMINOPHEN 10; 325 MG/1; MG/1
1 TABLET ORAL
COMMUNITY
End: 2018-04-09 | Stop reason: SDUPTHER

## 2018-04-02 ENCOUNTER — HOSPITAL ENCOUNTER (OUTPATIENT)
Dept: PHARMACY | Age: 63
Setting detail: THERAPIES SERIES
Discharge: HOME | End: 2018-04-02
Payer: MEDICARE

## 2018-04-02 DIAGNOSIS — I26.99 OTHER PULMONARY EMBOLISM WITHOUT ACUTE COR PULMONALE, UNSPECIFIED CHRONICITY (HCC): ICD-10-CM

## 2018-04-02 DIAGNOSIS — I82.4Y9 DEEP VEIN THROMBOSIS (DVT) OF PROXIMAL LOWER EXTREMITY, UNSPECIFIED CHRONICITY, UNSPECIFIED LATERALITY (HCC): ICD-10-CM

## 2018-04-02 LAB — POC INR: 2 (ref 0.8–1.2)

## 2018-04-02 PROCEDURE — 36416 COLLJ CAPILLARY BLOOD SPEC: CPT

## 2018-04-02 PROCEDURE — 99211 OFF/OP EST MAY X REQ PHY/QHP: CPT

## 2018-04-02 PROCEDURE — 85610 PROTHROMBIN TIME: CPT

## 2018-04-09 ENCOUNTER — OFFICE VISIT (OUTPATIENT)
Dept: PHYSICAL MEDICINE AND REHAB | Age: 63
End: 2018-04-09
Payer: MEDICARE

## 2018-04-09 VITALS
DIASTOLIC BLOOD PRESSURE: 78 MMHG | SYSTOLIC BLOOD PRESSURE: 112 MMHG | WEIGHT: 215 LBS | BODY MASS INDEX: 30.78 KG/M2 | HEIGHT: 70 IN | HEART RATE: 84 BPM

## 2018-04-09 DIAGNOSIS — G89.21 CHRONIC PAIN DUE TO TRAUMA: ICD-10-CM

## 2018-04-09 DIAGNOSIS — G57.02 NEUROPATHY OF LEFT SCIATIC NERVE: Primary | ICD-10-CM

## 2018-04-09 PROCEDURE — 3017F COLORECTAL CA SCREEN DOC REV: CPT | Performed by: NURSE PRACTITIONER

## 2018-04-09 PROCEDURE — G8427 DOCREV CUR MEDS BY ELIG CLIN: HCPCS | Performed by: NURSE PRACTITIONER

## 2018-04-09 PROCEDURE — G8417 CALC BMI ABV UP PARAM F/U: HCPCS | Performed by: NURSE PRACTITIONER

## 2018-04-09 PROCEDURE — 4004F PT TOBACCO SCREEN RCVD TLK: CPT | Performed by: NURSE PRACTITIONER

## 2018-04-09 PROCEDURE — 99213 OFFICE O/P EST LOW 20 MIN: CPT | Performed by: NURSE PRACTITIONER

## 2018-04-09 RX ORDER — HYDROCODONE BITARTRATE AND ACETAMINOPHEN 10; 325 MG/1; MG/1
1 TABLET ORAL EVERY 6 HOURS PRN
Qty: 120 TABLET | Refills: 0 | Status: SHIPPED | OUTPATIENT
Start: 2018-04-09 | End: 2018-05-09

## 2018-05-04 ENCOUNTER — HOSPITAL ENCOUNTER (OUTPATIENT)
Dept: PHARMACY | Age: 63
Setting detail: THERAPIES SERIES
Discharge: HOME | End: 2018-05-04
Payer: MEDICARE

## 2018-05-04 DIAGNOSIS — I26.99 OTHER PULMONARY EMBOLISM WITHOUT ACUTE COR PULMONALE, UNSPECIFIED CHRONICITY (HCC): ICD-10-CM

## 2018-05-04 DIAGNOSIS — I82.4Y9 DEEP VEIN THROMBOSIS (DVT) OF PROXIMAL LOWER EXTREMITY, UNSPECIFIED CHRONICITY, UNSPECIFIED LATERALITY (HCC): ICD-10-CM

## 2018-05-04 LAB — POC INR: 1.2 (ref 0.8–1.2)

## 2018-05-04 PROCEDURE — 36416 COLLJ CAPILLARY BLOOD SPEC: CPT

## 2018-05-04 PROCEDURE — 99211 OFF/OP EST MAY X REQ PHY/QHP: CPT

## 2018-05-04 PROCEDURE — 85610 PROTHROMBIN TIME: CPT

## 2018-05-14 ENCOUNTER — HOSPITAL ENCOUNTER (OUTPATIENT)
Dept: PHARMACY | Age: 63
Setting detail: THERAPIES SERIES
Discharge: HOME | End: 2018-05-14
Payer: MEDICARE

## 2018-05-14 DIAGNOSIS — I82.4Y9 DEEP VEIN THROMBOSIS (DVT) OF PROXIMAL LOWER EXTREMITY, UNSPECIFIED CHRONICITY, UNSPECIFIED LATERALITY (HCC): ICD-10-CM

## 2018-05-14 DIAGNOSIS — I26.99 OTHER PULMONARY EMBOLISM WITHOUT ACUTE COR PULMONALE, UNSPECIFIED CHRONICITY (HCC): ICD-10-CM

## 2018-05-14 LAB — POC INR: 1.9 (ref 0.8–1.2)

## 2018-05-14 PROCEDURE — 36416 COLLJ CAPILLARY BLOOD SPEC: CPT

## 2018-05-14 PROCEDURE — 99211 OFF/OP EST MAY X REQ PHY/QHP: CPT

## 2018-05-14 PROCEDURE — 85610 PROTHROMBIN TIME: CPT

## 2018-05-14 RX ORDER — HYDROCODONE BITARTRATE AND ACETAMINOPHEN 10; 325 MG/1; MG/1
1 TABLET ORAL EVERY 6 HOURS PRN
Refills: 0 | COMMUNITY
Start: 2018-05-10 | End: 2018-07-10 | Stop reason: SDUPTHER

## 2018-06-04 ENCOUNTER — HOSPITAL ENCOUNTER (OUTPATIENT)
Dept: PHARMACY | Age: 63
Setting detail: THERAPIES SERIES
Discharge: HOME | End: 2018-06-04
Payer: MEDICARE

## 2018-06-04 DIAGNOSIS — I82.4Y9 DEEP VEIN THROMBOSIS (DVT) OF PROXIMAL LOWER EXTREMITY, UNSPECIFIED CHRONICITY, UNSPECIFIED LATERALITY (HCC): ICD-10-CM

## 2018-06-04 DIAGNOSIS — I26.99 OTHER PULMONARY EMBOLISM WITHOUT ACUTE COR PULMONALE, UNSPECIFIED CHRONICITY (HCC): ICD-10-CM

## 2018-06-04 LAB — POC INR: 2.7 (ref 0.8–1.2)

## 2018-06-04 PROCEDURE — 36416 COLLJ CAPILLARY BLOOD SPEC: CPT

## 2018-06-04 PROCEDURE — 85610 PROTHROMBIN TIME: CPT

## 2018-06-04 PROCEDURE — 99211 OFF/OP EST MAY X REQ PHY/QHP: CPT

## 2018-07-05 ENCOUNTER — HOSPITAL ENCOUNTER (OUTPATIENT)
Dept: PHARMACY | Age: 63
Setting detail: THERAPIES SERIES
Discharge: HOME | End: 2018-07-05
Payer: MEDICARE

## 2018-07-05 DIAGNOSIS — I26.99 OTHER PULMONARY EMBOLISM WITHOUT ACUTE COR PULMONALE, UNSPECIFIED CHRONICITY (HCC): ICD-10-CM

## 2018-07-05 DIAGNOSIS — I82.4Y9 DEEP VEIN THROMBOSIS (DVT) OF PROXIMAL LOWER EXTREMITY, UNSPECIFIED CHRONICITY, UNSPECIFIED LATERALITY (HCC): ICD-10-CM

## 2018-07-05 LAB — POC INR: 1.6 (ref 0.8–1.2)

## 2018-07-05 PROCEDURE — 36416 COLLJ CAPILLARY BLOOD SPEC: CPT

## 2018-07-05 PROCEDURE — 85610 PROTHROMBIN TIME: CPT

## 2018-07-05 PROCEDURE — 99211 OFF/OP EST MAY X REQ PHY/QHP: CPT

## 2018-07-10 ENCOUNTER — OFFICE VISIT (OUTPATIENT)
Dept: PHYSICAL MEDICINE AND REHAB | Age: 63
End: 2018-07-10
Payer: MEDICARE

## 2018-07-10 VITALS
WEIGHT: 215 LBS | DIASTOLIC BLOOD PRESSURE: 76 MMHG | HEART RATE: 72 BPM | HEIGHT: 70 IN | BODY MASS INDEX: 30.78 KG/M2 | SYSTOLIC BLOOD PRESSURE: 120 MMHG

## 2018-07-10 DIAGNOSIS — G89.21 CHRONIC PAIN DUE TO TRAUMA: ICD-10-CM

## 2018-07-10 DIAGNOSIS — G57.02 NEUROPATHY OF LEFT SCIATIC NERVE: Primary | ICD-10-CM

## 2018-07-10 PROCEDURE — 99213 OFFICE O/P EST LOW 20 MIN: CPT | Performed by: NURSE PRACTITIONER

## 2018-07-10 PROCEDURE — 4004F PT TOBACCO SCREEN RCVD TLK: CPT | Performed by: NURSE PRACTITIONER

## 2018-07-10 PROCEDURE — G8417 CALC BMI ABV UP PARAM F/U: HCPCS | Performed by: NURSE PRACTITIONER

## 2018-07-10 PROCEDURE — G8427 DOCREV CUR MEDS BY ELIG CLIN: HCPCS | Performed by: NURSE PRACTITIONER

## 2018-07-10 PROCEDURE — 1123F ACP DISCUSS/DSCN MKR DOCD: CPT | Performed by: NURSE PRACTITIONER

## 2018-07-10 PROCEDURE — 4040F PNEUMOC VAC/ADMIN/RCVD: CPT | Performed by: NURSE PRACTITIONER

## 2018-07-10 PROCEDURE — 3017F COLORECTAL CA SCREEN DOC REV: CPT | Performed by: NURSE PRACTITIONER

## 2018-07-10 RX ORDER — HYDROCODONE BITARTRATE AND ACETAMINOPHEN 10; 325 MG/1; MG/1
1 TABLET ORAL EVERY 6 HOURS PRN
Qty: 120 TABLET | Refills: 0 | Status: SHIPPED | OUTPATIENT
Start: 2018-07-10 | End: 2018-08-09

## 2018-07-10 NOTE — PROGRESS NOTES
brain injury, [place]  · Left lower limb neuralgia  · Low back pain  · Left sciatic neuropathy  · Left foot drop    Plan:  · Continue Norco PRN for pain  · Continue HEP    Will continue to monitor any benefits vs side effects of the medications as prescribed. The patient has been warned about the risk of operating machinery including driving if impaired in any way by these medications. The patient also accepts the risks of tolerance, dependency, or addiction related to the prescribed medications. All questions were answered. Reevaluation as planned, or sooner if requested. Controlled Substances Monitoring: Attestation: The Prescription Monitoring Report for this patient was reviewed today. KVNG Healy - CNP)  Documentation: Possible medication side effects, risk of tolerance/dependence & alternative treatments discussed., No signs of potential drug abuse or diversion identified. KVNG Healy - NORMA)    Return in about 3 months (around 10/10/2018). It was my pleasure to evaluate Shireen Hanks today. Please call with any concerns or questions.   15 minutes spent in evaluation efforts    KVNG Zamora - CNP

## 2018-07-26 ENCOUNTER — HOSPITAL ENCOUNTER (OUTPATIENT)
Dept: PHARMACY | Age: 63
Setting detail: THERAPIES SERIES
Discharge: HOME | End: 2018-07-26
Payer: MEDICARE

## 2018-07-26 DIAGNOSIS — I82.4Y9 DEEP VEIN THROMBOSIS (DVT) OF PROXIMAL LOWER EXTREMITY, UNSPECIFIED CHRONICITY, UNSPECIFIED LATERALITY (HCC): ICD-10-CM

## 2018-07-26 DIAGNOSIS — I26.99 OTHER PULMONARY EMBOLISM WITHOUT ACUTE COR PULMONALE, UNSPECIFIED CHRONICITY (HCC): ICD-10-CM

## 2018-07-26 LAB — POC INR: 2.1 (ref 0.8–1.2)

## 2018-07-26 PROCEDURE — 85610 PROTHROMBIN TIME: CPT | Performed by: PHARMACIST

## 2018-07-26 PROCEDURE — 99211 OFF/OP EST MAY X REQ PHY/QHP: CPT | Performed by: PHARMACIST

## 2018-07-26 PROCEDURE — 36416 COLLJ CAPILLARY BLOOD SPEC: CPT | Performed by: PHARMACIST

## 2018-08-23 ENCOUNTER — HOSPITAL ENCOUNTER (OUTPATIENT)
Dept: PHARMACY | Age: 63
Setting detail: THERAPIES SERIES
Discharge: HOME | End: 2018-08-23
Payer: MEDICARE

## 2018-08-23 LAB — POC INR: 1.1 (ref 0.8–1.2)

## 2018-08-23 PROCEDURE — 36416 COLLJ CAPILLARY BLOOD SPEC: CPT

## 2018-08-23 PROCEDURE — 99211 OFF/OP EST MAY X REQ PHY/QHP: CPT

## 2018-08-23 PROCEDURE — 85610 PROTHROMBIN TIME: CPT

## 2018-08-23 RX ORDER — HYDROCODONE BITARTRATE AND ACETAMINOPHEN 10; 325 MG/1; MG/1
1 TABLET ORAL EVERY 6 HOURS PRN
COMMUNITY
End: 2018-10-09 | Stop reason: SDUPTHER

## 2018-08-23 NOTE — PROGRESS NOTES
Medication Management [facility]  Anticoagulation Clinic  [phone] (phone)  [phone] (fax)      Mr. Erika Mas is a 70 y.o.  male with history of DVT, PE who presents today for anticoagulation monitoring and adjustment. States he follows the calendar. Knows he takes 1.5 tablets some days and 2 tablets other days  No missed or extra doses. Patient denies s/s bleeding/bruising/swelling/SOB/chest pain  No blood in urine or stool. States he drank a V8 juice 3 days ago, had some maccaroni salad 2 days ago and putting mayonnaise on sandwiches   No changes in medication/OTC agents/Herbals. No change in alcohol use. Thinks he has been smoking more this week. States he has been more active and has had more stress  Patient denies headaches/dizziness/lightheadedness/falls. No vomiting/diarrhea or acute illness. No Procedures scheduled in the future at this time. Assessment:   Lab Results   Component Value Date    INR 1.10 08/23/2018    INR 2.10 (H) 07/26/2018    INR 1.60 (H) 07/05/2018     INR subtherapeutic   Recent Labs      08/23/18   1359   INR  1.10     Plan:  Take 15 mg today and tomorrow, 12.5 mg on Sat then continue Coumadin 7.5 mg MoWeFr and 10 mg SuTuThSa. Recheck INR 1 weeks. Patient reminded to call the Anticoagulation Clinic with any signs or symptoms of bleeding or with any medication changes. Patient given instructions utilizing the teach back method. Discharged ambulatory in no apparent distress. After visit summary printed and reviewed with patient.       Medications reviewed and updated on home medication list Yes    Influenza vaccine:     [] given    [] declined   [] received previously   [] plans to receive at a later time   [] refused    [] documented in EPIC

## 2018-08-30 ENCOUNTER — HOSPITAL ENCOUNTER (OUTPATIENT)
Dept: PHARMACY | Age: 63
Setting detail: THERAPIES SERIES
Discharge: HOME | End: 2018-08-30
Payer: MEDICARE

## 2018-08-30 DIAGNOSIS — I26.99 OTHER PULMONARY EMBOLISM WITHOUT ACUTE COR PULMONALE, UNSPECIFIED CHRONICITY (HCC): ICD-10-CM

## 2018-08-30 DIAGNOSIS — I82.4Y9 DEEP VEIN THROMBOSIS (DVT) OF PROXIMAL LOWER EXTREMITY, UNSPECIFIED CHRONICITY, UNSPECIFIED LATERALITY (HCC): ICD-10-CM

## 2018-08-30 LAB — POC INR: 3 (ref 0.8–1.2)

## 2018-08-30 PROCEDURE — 85610 PROTHROMBIN TIME: CPT

## 2018-08-30 PROCEDURE — 36416 COLLJ CAPILLARY BLOOD SPEC: CPT

## 2018-08-30 PROCEDURE — 99211 OFF/OP EST MAY X REQ PHY/QHP: CPT

## 2018-08-30 NOTE — PROGRESS NOTES
Medication Management [facility]  Anticoagulation Clinic  [phone] (phone)  [phone] (fax)      Mr. Belen Arriola is a 70 y.o.  male with history of DVT who presents today for anticoagulation monitoring and adjustment. Patient verifies current dosing regimen and tablet strength. No missed or extra doses. Patient denies s/s bleeding/bruising/swelling/SOB/chest pain  No blood in urine or stool. Has recently reduced mayonnaise intake. No changes in medication/OTC agents/Herbals. No change in alcohol use or tobacco use. No change in activity level. Patient denies headaches/dizziness/falls. Baseline dizziness when standing. No vomiting/diarrhea or acute illness. No Procedures scheduled in the future at this time. Assessment:   Lab Results   Component Value Date    INR 3.00 (H) 08/30/2018    INR 1.10 08/23/2018    INR 2.10 (H) 07/26/2018     INR therapeutic   Recent Labs      08/30/18   1513   INR  3.00*     Patient was previously well-controlled on this regimen until recently with labile INRs. Most recent subtherapeutic INR attributed to diet and increased smoking, which patient reports changes in diet to reduce vitamin K.    Plan:  Continue Coumadin 7.5 mg MWF and 10 mg TuThSaSu. Recheck INR 10 days. Patient reminded to call the Anticoagulation Clinic with any signs or symptoms of bleeding or with any medication changes. Patient given instructions utilizing the teach back method. Discharged ambulatory in no apparent distress. After visit summary printed and reviewed with patient.       Medications reviewed and updated on home medication list Yes    Influenza vaccine:     [] given    [x] declined   [] received previously   [] plans to receive at a later time   [x] refused    [] documented in [place] [address], PharmD, [address] [facility]  8/30/2018  3:30 PM

## 2018-10-09 ENCOUNTER — OFFICE VISIT (OUTPATIENT)
Dept: PHYSICAL MEDICINE AND REHAB | Age: 63
End: 2018-10-09
Payer: MEDICARE

## 2018-10-09 ENCOUNTER — HOSPITAL ENCOUNTER (OUTPATIENT)
Dept: PHARMACY | Age: 63
Setting detail: THERAPIES SERIES
Discharge: HOME | End: 2018-10-09
Payer: MEDICARE

## 2018-10-09 VITALS
HEART RATE: 73 BPM | HEIGHT: 70 IN | DIASTOLIC BLOOD PRESSURE: 82 MMHG | WEIGHT: 215 LBS | BODY MASS INDEX: 30.78 KG/M2 | SYSTOLIC BLOOD PRESSURE: 124 MMHG

## 2018-10-09 DIAGNOSIS — I82.492 DEEP VEIN THROMBOSIS (DVT) OF OTHER VEIN OF LEFT LOWER EXTREMITY, UNSPECIFIED CHRONICITY (HCC): Primary | ICD-10-CM

## 2018-10-09 DIAGNOSIS — G57.02 NEUROPATHY OF LEFT SCIATIC NERVE: Primary | ICD-10-CM

## 2018-10-09 DIAGNOSIS — G89.21 CHRONIC PAIN DUE TO TRAUMA: ICD-10-CM

## 2018-10-09 LAB — POC INR: 1.9 (ref 0.8–1.2)

## 2018-10-09 PROCEDURE — G8427 DOCREV CUR MEDS BY ELIG CLIN: HCPCS | Performed by: NURSE PRACTITIONER

## 2018-10-09 PROCEDURE — 1101F PT FALLS ASSESS-DOCD LE1/YR: CPT | Performed by: NURSE PRACTITIONER

## 2018-10-09 PROCEDURE — 36416 COLLJ CAPILLARY BLOOD SPEC: CPT | Performed by: PHARMACIST

## 2018-10-09 PROCEDURE — 4040F PNEUMOC VAC/ADMIN/RCVD: CPT | Performed by: NURSE PRACTITIONER

## 2018-10-09 PROCEDURE — G8484 FLU IMMUNIZE NO ADMIN: HCPCS | Performed by: NURSE PRACTITIONER

## 2018-10-09 PROCEDURE — 3017F COLORECTAL CA SCREEN DOC REV: CPT | Performed by: NURSE PRACTITIONER

## 2018-10-09 PROCEDURE — 1123F ACP DISCUSS/DSCN MKR DOCD: CPT | Performed by: NURSE PRACTITIONER

## 2018-10-09 PROCEDURE — G8417 CALC BMI ABV UP PARAM F/U: HCPCS | Performed by: NURSE PRACTITIONER

## 2018-10-09 PROCEDURE — 4004F PT TOBACCO SCREEN RCVD TLK: CPT | Performed by: NURSE PRACTITIONER

## 2018-10-09 PROCEDURE — 99211 OFF/OP EST MAY X REQ PHY/QHP: CPT | Performed by: PHARMACIST

## 2018-10-09 PROCEDURE — 99213 OFFICE O/P EST LOW 20 MIN: CPT | Performed by: NURSE PRACTITIONER

## 2018-10-09 PROCEDURE — 85610 PROTHROMBIN TIME: CPT | Performed by: PHARMACIST

## 2018-10-09 RX ORDER — HYDROCODONE BITARTRATE AND ACETAMINOPHEN 10; 325 MG/1; MG/1
1 TABLET ORAL EVERY 6 HOURS PRN
Qty: 120 TABLET | Refills: 0 | Status: SHIPPED | OUTPATIENT
Start: 2018-10-09 | End: 2018-11-08

## 2018-10-09 NOTE — PROGRESS NOTES
Medication Management [facility]  Anticoagulation Clinic  [phone] (phone)  [phone] (fax)      Mr. Patsy Amin is a 70 y.o.  male with history of DVT, PE who presents today for anticoagulation monitoring and adjustment. Patient verifies current dosing regimen and tablet strength. No missed or extra doses. Patient denies s/s bleeding/bruising/swelling/SOB/chest pain  No blood in urine or stool. No dietary changes. - stopped eating mayonaise  No changes in medication/OTC agents/Herbals. No change in alcohol use or tobacco use. No change in activity level. Patient denies headaches/dizziness/lightheadedness/falls. No vomiting/diarrhea or acute illness. No Procedures scheduled in the future at this time. Assessment:   Lab Results   Component Value Date    INR 1.90 (H) 10/09/2018    INR 3.00 (H) 08/30/2018    INR 1.10 08/23/2018     INR therapeutic   Recent Labs      10/09/18   1400   INR  1.90*         Plan:  Continue Coumadin 7.5mg MWF and 10mg TThSaS. Recheck INR in 3 weeks. Pt given order for H/H. Patient reminded to call the Anticoagulation Clinic with any signs or symptoms of bleeding or with any medication changes. Patient given instructions utilizing the teach back method. Discharged ambulatory in no apparent distress. After visit summary printed and reviewed with patient.       Medications reviewed and updated on home medication list Yes    Influenza vaccine:     [] given    [] declined   [] received previously   [] plans to receive at a later time   [x] refused    [x] documented in EPIC

## 2018-11-12 DIAGNOSIS — I82.401 DEEP VEIN THROMBOSIS (DVT) OF RIGHT LOWER EXTREMITY, UNSPECIFIED CHRONICITY, UNSPECIFIED VEIN (HCC): ICD-10-CM

## 2018-11-12 RX ORDER — WARFARIN SODIUM 5 MG/1
TABLET ORAL
Qty: 60 TABLET | Refills: 11 | Status: SHIPPED | OUTPATIENT
Start: 2018-11-12 | End: 2019-01-07 | Stop reason: SDDI

## 2019-01-07 ENCOUNTER — TELEPHONE (OUTPATIENT)
Dept: PHARMACY | Age: 64
End: 2019-01-07

## 2019-01-08 DIAGNOSIS — G89.4 CHRONIC PAIN SYNDROME: Primary | ICD-10-CM

## 2019-01-08 RX ORDER — HYDROCODONE BITARTRATE AND ACETAMINOPHEN 10; 325 MG/1; MG/1
1 TABLET ORAL EVERY 6 HOURS PRN
Qty: 120 TABLET | Refills: 0 | Status: SHIPPED | OUTPATIENT
Start: 2019-01-09 | End: 2019-01-14 | Stop reason: SDUPTHER

## 2019-01-14 ENCOUNTER — OFFICE VISIT (OUTPATIENT)
Dept: PHYSICAL MEDICINE AND REHAB | Age: 64
End: 2019-01-14
Payer: MEDICARE

## 2019-01-14 VITALS
DIASTOLIC BLOOD PRESSURE: 78 MMHG | HEIGHT: 70 IN | HEART RATE: 80 BPM | BODY MASS INDEX: 30.78 KG/M2 | SYSTOLIC BLOOD PRESSURE: 113 MMHG | WEIGHT: 215 LBS

## 2019-01-14 DIAGNOSIS — G57.02 NEUROPATHY OF LEFT SCIATIC NERVE: Primary | ICD-10-CM

## 2019-01-14 DIAGNOSIS — G89.4 CHRONIC PAIN SYNDROME: ICD-10-CM

## 2019-01-14 DIAGNOSIS — I82.5Z2 CHRONIC DEEP VEIN THROMBOSIS (DVT) OF DISTAL VEIN OF LEFT LOWER EXTREMITY (HCC): ICD-10-CM

## 2019-01-14 PROCEDURE — G8484 FLU IMMUNIZE NO ADMIN: HCPCS | Performed by: NURSE PRACTITIONER

## 2019-01-14 PROCEDURE — G8427 DOCREV CUR MEDS BY ELIG CLIN: HCPCS | Performed by: NURSE PRACTITIONER

## 2019-01-14 PROCEDURE — G8417 CALC BMI ABV UP PARAM F/U: HCPCS | Performed by: NURSE PRACTITIONER

## 2019-01-14 PROCEDURE — 3017F COLORECTAL CA SCREEN DOC REV: CPT | Performed by: NURSE PRACTITIONER

## 2019-01-14 PROCEDURE — 4004F PT TOBACCO SCREEN RCVD TLK: CPT | Performed by: NURSE PRACTITIONER

## 2019-01-14 PROCEDURE — 1123F ACP DISCUSS/DSCN MKR DOCD: CPT | Performed by: NURSE PRACTITIONER

## 2019-01-14 PROCEDURE — 1101F PT FALLS ASSESS-DOCD LE1/YR: CPT | Performed by: NURSE PRACTITIONER

## 2019-01-14 PROCEDURE — 4040F PNEUMOC VAC/ADMIN/RCVD: CPT | Performed by: NURSE PRACTITIONER

## 2019-01-14 PROCEDURE — 99213 OFFICE O/P EST LOW 20 MIN: CPT | Performed by: NURSE PRACTITIONER

## 2019-01-14 RX ORDER — HYDROCODONE BITARTRATE AND ACETAMINOPHEN 10; 325 MG/1; MG/1
1 TABLET ORAL EVERY 6 HOURS PRN
Qty: 120 TABLET | Refills: 0 | Status: SHIPPED | OUTPATIENT
Start: 2019-01-14 | End: 2019-02-13

## 2019-01-14 RX ORDER — WARFARIN SODIUM 5 MG/1
5 TABLET ORAL
COMMUNITY

## 2019-04-15 ENCOUNTER — OFFICE VISIT (OUTPATIENT)
Dept: PHYSICAL MEDICINE AND REHAB | Age: 64
End: 2019-04-15
Payer: MEDICARE

## 2019-04-15 VITALS
SYSTOLIC BLOOD PRESSURE: 115 MMHG | HEIGHT: 70 IN | WEIGHT: 222 LBS | DIASTOLIC BLOOD PRESSURE: 73 MMHG | HEART RATE: 63 BPM | BODY MASS INDEX: 31.78 KG/M2

## 2019-04-15 DIAGNOSIS — G57.02 NEUROPATHY OF LEFT SCIATIC NERVE: Primary | ICD-10-CM

## 2019-04-15 DIAGNOSIS — G89.29 OTHER CHRONIC PAIN: ICD-10-CM

## 2019-04-15 PROCEDURE — G8417 CALC BMI ABV UP PARAM F/U: HCPCS | Performed by: NURSE PRACTITIONER

## 2019-04-15 PROCEDURE — 1123F ACP DISCUSS/DSCN MKR DOCD: CPT | Performed by: NURSE PRACTITIONER

## 2019-04-15 PROCEDURE — 4040F PNEUMOC VAC/ADMIN/RCVD: CPT | Performed by: NURSE PRACTITIONER

## 2019-04-15 PROCEDURE — 99213 OFFICE O/P EST LOW 20 MIN: CPT | Performed by: NURSE PRACTITIONER

## 2019-04-15 PROCEDURE — 3017F COLORECTAL CA SCREEN DOC REV: CPT | Performed by: NURSE PRACTITIONER

## 2019-04-15 PROCEDURE — G8427 DOCREV CUR MEDS BY ELIG CLIN: HCPCS | Performed by: NURSE PRACTITIONER

## 2019-04-15 PROCEDURE — 4004F PT TOBACCO SCREEN RCVD TLK: CPT | Performed by: NURSE PRACTITIONER

## 2019-04-15 RX ORDER — HYDROCODONE BITARTRATE AND ACETAMINOPHEN 10; 325 MG/1; MG/1
1 TABLET ORAL EVERY 6 HOURS PRN
Qty: 120 TABLET | Refills: 0 | Status: SHIPPED | OUTPATIENT
Start: 2019-04-15 | End: 2019-05-15

## 2019-04-15 RX ORDER — HYDROCODONE BITARTRATE AND ACETAMINOPHEN 10; 325 MG/1; MG/1
TABLET ORAL
Refills: 0 | COMMUNITY
Start: 2019-04-09 | End: 2019-04-15

## 2019-04-15 NOTE — PROGRESS NOTES
[address] [facility]  Outpatient progress note    Chief Complaint:   Chief Complaint   Patient presents with    Follow-up     neuropathy sciatic nerve, low back pain        Subjective: Anila Chacko is a 70 y.o. male who returns to the office today for further follow up. Patient reports that his symptoms are stable. He has had issues with weather changes in the past couple months, however nothing new. Medications reviewed. Patient denies side effects with medications. Patient states he is taking medications as prescribed. Denies receiving pain medications from other sources. He denies any ER visits since last visit. Pain scale with out pain medications or at its worst is 6/10. Pain scale with pain medications or at its best is 3/10. Last dose of Coleman was today  Drug screen reviewed from 1/14/2019 and was appropriate  Patient does not have naloxone available at home.        Review of Systems:  CONSTITUTIONAL:  negative  EYES:  negative  HEENT:  negative  RESPIRATORY:  negative  CARDIOVASCULAR:  negative  GASTROINTESTINAL:  negative  GENITOURINARY:  negative  SKIN:  negative  HEMATOLOGIC/LYMPHATIC:  negative  MUSCULOSKELETAL:  negative  NEUROLOGICAL:  positive for pain  BEHAVIOR/PSYCH:  negative  All other review of systems otherwise negative    Physical Exam:  /73 (Site: Right Upper Arm, Position: Sitting, Cuff Size: Large Adult)   Pulse 63   Ht 5' 10\" (1.778 m)   Wt 222 lb (100.7 kg)   BMI 31.85 kg/m²     awake  Orientation:   person, place, time  Mood: within normal limits  Affect: calm  General appearance: Patient is well nourished, well developed, well groomed and in no acute distress    Memory:   normal,   Attention/Concentration: normal  Language:  normal    Cranial Nerves:  cranial nerves II-XII are grossly intact  ROM:  normal  Motor Exam:  Left ADF 4/5; strength intact everywhere else  Tone:  normal  Muscle bulk: within normal limits    Skin: warm and dry, no rash or erythema  Peripheral vascular: Pulses: Normal upper and lower extremity pulses; Edema: no      Impression:  · Traumatic brain injury, [place] [address]  · Left lower limb neuralgia  · Low back pain  · Left sciatic neuropathy  · Left foot drop    Plan:  · Continue Norco PRN for pain- refills given  · Continue HEP  · OARRS reviewed. Current MED: 40  · Patient was offered naloxone for home, patient declined. · Discussed long term side effects of medications, tolerance, dependency and addiction. · Previous UDS reviewed  · UDS preformed today for compliance. · Patient told can not receive any pain medications from any other source. · No evidence of abuse, diversion or aberrant behavior. Medications and/or procedures to improve function and quality of life- patient understanding with this and that may not be pain free  Discussed with patient about safe storage of medications at home      Will continue to monitor any benefits vs side effects of the medications as prescribed. The patient has been warned about the risk of operating machinery including driving if impaired in any way by these medications. The patient also accepts the risks of tolerance, dependency, or addiction related to the prescribed medications. All questions were answered. Reevaluation as planned, or sooner if requested. Controlled Substances Monitoring: Attestation: The Prescription Monitoring Report for this patient was reviewed today. KVNG Moore  Chronic Pain Routine Monitoring: Possible medication side effects, risk of tolerance/dependence & alternative treatments discussed. , Random urine drug screen sent today., No signs of potential drug abuse or diversion identified: otherwise, see note documentation KVNG Dos Santos CNP)    Return in about 3 months (around 7/15/2019). It was my pleasure to evaluate Monisha Browne today.   Please call with any concerns or questions.   15 minutes spent in evaluation efforts    Pricilla Muñoz, APRN - CNP

## 2019-07-08 DIAGNOSIS — G89.29 OTHER CHRONIC PAIN: ICD-10-CM

## 2019-07-09 RX ORDER — HYDROCODONE BITARTRATE AND ACETAMINOPHEN 10; 325 MG/1; MG/1
1 TABLET ORAL EVERY 6 HOURS PRN
Qty: 120 TABLET | Refills: 0 | OUTPATIENT
Start: 2019-07-09 | End: 2019-08-08

## 2019-07-15 ENCOUNTER — OFFICE VISIT (OUTPATIENT)
Dept: PHYSICAL MEDICINE AND REHAB | Age: 64
End: 2019-07-15
Payer: MEDICARE

## 2019-07-15 VITALS
HEART RATE: 72 BPM | DIASTOLIC BLOOD PRESSURE: 76 MMHG | HEIGHT: 70 IN | WEIGHT: 214 LBS | BODY MASS INDEX: 30.64 KG/M2 | SYSTOLIC BLOOD PRESSURE: 138 MMHG

## 2019-07-15 DIAGNOSIS — G57.02 NEUROPATHY OF LEFT SCIATIC NERVE: Primary | ICD-10-CM

## 2019-07-15 DIAGNOSIS — G89.29 OTHER CHRONIC PAIN: ICD-10-CM

## 2019-07-15 PROCEDURE — G8417 CALC BMI ABV UP PARAM F/U: HCPCS | Performed by: NURSE PRACTITIONER

## 2019-07-15 PROCEDURE — 99213 OFFICE O/P EST LOW 20 MIN: CPT | Performed by: NURSE PRACTITIONER

## 2019-07-15 PROCEDURE — G8427 DOCREV CUR MEDS BY ELIG CLIN: HCPCS | Performed by: NURSE PRACTITIONER

## 2019-07-15 PROCEDURE — 4004F PT TOBACCO SCREEN RCVD TLK: CPT | Performed by: NURSE PRACTITIONER

## 2019-07-15 PROCEDURE — 4040F PNEUMOC VAC/ADMIN/RCVD: CPT | Performed by: NURSE PRACTITIONER

## 2019-07-15 PROCEDURE — 1123F ACP DISCUSS/DSCN MKR DOCD: CPT | Performed by: NURSE PRACTITIONER

## 2019-07-15 PROCEDURE — 3017F COLORECTAL CA SCREEN DOC REV: CPT | Performed by: NURSE PRACTITIONER

## 2019-07-15 NOTE — PROGRESS NOTES
[address] [facility]  Outpatient progress note    Chief Complaint:   Chief Complaint   Patient presents with    Follow-up     TBI, low back pain        Subjective: Denzel Hoskins is a 75 y.o. male who returns to the office today for further follow up. Patient reports that the symptoms in his low back and leg are stable. Continues to have paresthesias in left lower limb. No changes. No new issues. Persistent mild left foot drop. Continues to remain active. Using Norco which is effective. Medications reviewed. Patient denies side effects with medications. Patient states he is taking medications as prescribed. Denies receiving pain medications from other sources. He denies any ER visits since last visit. Pain scale with out pain medications or at its worst is 10/10. Pain scale with pain medications or at its best is 1-2/10. Last dose of Norco was today  Drug screen reviewed from 4/15/2019 and was +THC, alcohol  Patient does not have naloxone available at home. Counseled patient on taking norco with alcohol. Patient admits he did drink and smoke THC last week, 2 of his brothers  and was a stressful week for him. Will need clean UDS before prescribing anymore refills. Patient voices understanding.        Review of Systems:  CONSTITUTIONAL:  negative  EYES:  negative  HEENT:  negative  RESPIRATORY:  negative  CARDIOVASCULAR:  negative  GASTROINTESTINAL:  negative  GENITOURINARY:  negative  SKIN:  negative  HEMATOLOGIC/LYMPHATIC:  negative  MUSCULOSKELETAL:  negative  NEUROLOGICAL:  positive for pain  BEHAVIOR/PSYCH:  negative  All other review of systems otherwise negative    Physical Exam:  /76 (Site: Right Upper Arm, Position: Sitting, Cuff Size: Medium Adult)   Pulse 72   Ht 5' 10\" (1.778 m)   Wt 214 lb (97.1 kg)   BMI 30.71 kg/m²     awake  Orientation:   person, place, time  Mood: within normal limits  Affect: calm  General